# Patient Record
Sex: FEMALE | Race: WHITE | NOT HISPANIC OR LATINO | URBAN - METROPOLITAN AREA
[De-identification: names, ages, dates, MRNs, and addresses within clinical notes are randomized per-mention and may not be internally consistent; named-entity substitution may affect disease eponyms.]

---

## 2021-02-02 ENCOUNTER — OFFICE VISIT - HEALTHEAST (OUTPATIENT)
Dept: FAMILY MEDICINE | Facility: CLINIC | Age: 31
End: 2021-02-02

## 2021-02-02 DIAGNOSIS — Z30.9 ENCOUNTER FOR CONTRACEPTIVE MANAGEMENT, UNSPECIFIED TYPE: ICD-10-CM

## 2021-02-02 RX ORDER — LEVONORGESTREL AND ETHINYL ESTRADIOL 0.1-0.02MG
1 KIT ORAL DAILY
Qty: 90 TABLET | Refills: 3 | Status: SHIPPED | OUTPATIENT
Start: 2021-02-02 | End: 2022-01-05

## 2021-02-02 ASSESSMENT — MIFFLIN-ST. JEOR: SCORE: 1299.01

## 2021-03-08 ENCOUNTER — OFFICE VISIT - HEALTHEAST (OUTPATIENT)
Dept: FAMILY MEDICINE | Facility: CLINIC | Age: 31
End: 2021-03-08

## 2021-03-08 DIAGNOSIS — Z00.00 ANNUAL PHYSICAL EXAM: ICD-10-CM

## 2021-03-08 DIAGNOSIS — F41.9 ANXIETY: ICD-10-CM

## 2021-03-08 RX ORDER — ESCITALOPRAM OXALATE 10 MG/1
10 TABLET ORAL DAILY
Qty: 90 TABLET | Refills: 3 | Status: SHIPPED | OUTPATIENT
Start: 2021-03-08 | End: 2022-03-14

## 2021-03-08 ASSESSMENT — MIFFLIN-ST. JEOR: SCORE: 1289.94

## 2021-04-29 ENCOUNTER — RECORDS - HEALTHEAST (OUTPATIENT)
Dept: FAMILY MEDICINE | Facility: CLINIC | Age: 31
End: 2021-04-29

## 2021-06-05 VITALS
HEIGHT: 65 IN | SYSTOLIC BLOOD PRESSURE: 107 MMHG | BODY MASS INDEX: 20.83 KG/M2 | DIASTOLIC BLOOD PRESSURE: 70 MMHG | RESPIRATION RATE: 20 BRPM | HEART RATE: 66 BPM | WEIGHT: 125 LBS

## 2021-06-05 VITALS
DIASTOLIC BLOOD PRESSURE: 72 MMHG | HEIGHT: 65 IN | SYSTOLIC BLOOD PRESSURE: 123 MMHG | RESPIRATION RATE: 20 BRPM | HEART RATE: 71 BPM | BODY MASS INDEX: 21.16 KG/M2 | WEIGHT: 127 LBS | TEMPERATURE: 98.1 F

## 2021-06-14 NOTE — PROGRESS NOTES
"  Subjective:    Ronaldo Viveros is a 30 y.o. female who presents with chief complaint of medication management.  She needs refill of her birth control pills.  Right now she is on the week of her period.  Has been on birth control for a while, no concerns.  No history of hypertension, non-smoker, no history of blood clots.  Otherwise healthy.  No other regular medications.  No past surgeries.  No past hospitalizations.  She notes her mother had is having some type of kidney problem but is not quite sure what is going on yet.  I had her sign an SAMANTHA to get records from her previous clinic.  When those are available I will review records and update her chart.  It is possible she is due for a Pap smear this year.    Patient Active Problem List   Diagnosis     Encounter for contraceptive management, unspecified type       Current Outpatient Medications:      LESSINA 0.1-20 mg-mcg per tablet, Take 1 tablet by mouth daily., Disp: 90 tablet, Rfl: 3     Objective:   Allergies:  Patient has no known allergies.    Vitals:  Vitals:    02/02/21 0752   BP: 123/72   Patient Site: Right Arm   Patient Position: Sitting   Cuff Size: Adult Regular   Pulse: 71   Resp: 20   Temp: 98.1  F (36.7  C)   TempSrc: Temporal   Weight: 127 lb (57.6 kg)   Height: 5' 4.5\" (1.638 m)     Body mass index is 21.46 kg/m .    Vital signs reviewed.  General: Patient is alert and oriented x 3, in no apparent distress  Cardiac: regular rate and rhythm, no murmurs  Pulmonary: lungs clear to auscultation bilaterally, no crackles, rales, rhonchi, or wheezing noted      Assessment and Plan:   1. Encounter for contraceptive management, unspecified type  Birth control refilled.  It is possible she is due for a Pap smear.  I will review records when available and we will notify her.  No other concerns today.  She declines a flu shot.  - LESSINA 0.1-20 mg-mcg per tablet; Take 1 tablet by mouth daily.  Dispense: 90 tablet; Refill: 3    This dictation uses voice " recognition software, which may contain typographical errors.

## 2021-06-15 NOTE — PROGRESS NOTES
"  Subjective:     Ronaldo Viveros is a 30 y.o. female who presents for an annual exam.     Other concerns today:  1.  Anxiety.  She has had anxiety a long time, but feels like it has been getting worse for the past 1 to 2 years.  During that time she has moved back to [place], switched jobs, and of course pandemic has been taking place.  She has never taken medicine for anxiety before.  She is a therapist, and is also seeing a therapist weekly.  She feels like a lot of her anxiety is due to her family and her fiancé.  She says she feels pressure to be a certain way.  Also its hard to relax after day at work and she is feeling burned out.  She says she feels \"activated all the time.\"  Appetite is pretty good, sleeping pretty good.    We have not yet got her old records.  She got her first Covid vaccine on 2/27/2021 Moderna.    Immunization History   Administered Date(s) Administered     COVID-19,PF,Moderna 02/27/2021     DTP 08/10/1994, 04/29/1996     DTaP, historic 04/09/2009     HPV Quadrivalent 03/31/2015, 06/09/2015, 09/23/2015     Hep A, Adult IM (19yr & older) 04/09/2009     Hep B, Peds or Adolescent 11/14/2003, 12/22/2003, 06/29/2004     HiB, historic,unspecified 08/10/1994, 04/29/1996     IPV 08/10/1994, 04/29/1996     MMR 04/29/1996, 11/14/2003     Td, Adult, Absorbed 11/14/2003     Tdap 02/06/2014       Gynecologic History  Patient's last menstrual period was 03/03/2021.  Contraception: OCP (estrogen/progesterone)  Last Pap: 3 yrs ago?  Last mammogram: n/a    OB History   No obstetric history on file.         Current Outpatient Medications:      LESSINA 0.1-20 mg-mcg per tablet, Take 1 tablet by mouth daily., Disp: 90 tablet, Rfl: 3     escitalopram oxalate (LEXAPRO) 10 MG tablet, Take 1 tablet (10 mg total) by mouth daily., Disp: 90 tablet, Rfl: 3  No past medical history on file.  No past surgical history on file.  Patient has no known allergies.  No family history on file.  Social History "     Socioeconomic History     Marital status: Single     Spouse name: Not on file     Number of children: Not on file     Years of education: Not on file     Highest education level: Not on file   Occupational History     Not on file   Social Needs     Financial resource strain: Not on file     Food insecurity     Worry: Not on file     Inability: Not on file     Transportation needs     Medical: Not on file     Non-medical: Not on file   Tobacco Use     Smoking status: Never Smoker     Smokeless tobacco: Never Used   Substance and Sexual Activity     Alcohol use: Not on file     Drug use: Not on file     Sexual activity: Not on file   Lifestyle     Physical activity     Days per week: Not on file     Minutes per session: Not on file     Stress: Not on file   Relationships     Social connections     Talks on phone: Not on file     Gets together: Not on file     Attends [employer] service: Not on file     Active member of club or organization: Not on file     Attends meetings of clubs or organizations: Not on file     Relationship status: Not on file     Intimate partner violence     Fear of current or ex partner: Not on file     Emotionally abused: Not on file     Physically abused: Not on file     Forced sexual activity: Not on file   Other Topics Concern     Not on file   Social History Narrative     Not on file       Review of Systems  Complete Review of Systems is discussed with patient and is negative except as noted in HPI.    Objective:     Vitals:    03/08/21 0835   BP: 107/70   Pulse: 66   Resp: 20     Body mass index is 21.12 kg/m .    Physical Exam:  General: Patient is alert and Oriented x 3, in no apparent distress.  HEENT, Thyroid, Lymphatic, Cardiac, Pulmonary, GI, Musculoskeletal, and Neuro exams were completed today and grossly normal.  Breast Exam: No lumps, skin changes, lymphadenopathy, or nipple discharge noted bilaterally.  Genitourinary Exam: External genitalia is normal in appearance, vaginal  walls are healthy and without lesions, no significant discharge noted in the vaginal vault, cervix is well visualized and normal in appearance.  Pap was taken without difficulty.    No results found for this or any previous visit.    Assessment and Plan:     1. Annual physical exam  Health Maintenance discussed with patient as appropriate for age and risk factors.  Pap smear completed today.  She is using birth control pills without problem.  She declines STD testing.  We did not offer her flu shot because she is in the process of getting vaccinated for Covid.  Chart updated today.  Second Covid shot due at the end of March.  - Gynecologic Cytology (PAP Smear)    2. Anxiety  We discussed treatment options.  She is already seeing a therapist weekly.  She will continue that.  We discussed as needed meds versus a daily medicine.  She thinks she would like to try daily medicine.  Prescription sent for Lexapro.  Risks, benefits, possible side effects discussed with patient.  If she has any problems she will let us know.  If symptoms worsen while on this medicine she will stop the medicine and let us know.  She will discuss this with her therapist as well.  Discussed other lifestyle modifications including increasing exercise, etc.  Plan follow-up in 1 month.  If things are going well, message sent through [employer] should be fine.  Otherwise follow-up visit, could be virtual.  - escitalopram oxalate (LEXAPRO) 10 MG tablet; Take 1 tablet (10 mg total) by mouth daily.  Dispense: 90 tablet; Refill: 3    This dictation uses voice recognition software, which may contain typographical errors.

## 2021-06-16 PROBLEM — Z30.9 ENCOUNTER FOR CONTRACEPTIVE MANAGEMENT, UNSPECIFIED TYPE: Status: ACTIVE | Noted: 2021-02-02

## 2021-06-16 PROBLEM — F41.9 ANXIETY: Status: ACTIVE | Noted: 2021-03-15

## 2021-06-26 ENCOUNTER — HEALTH MAINTENANCE LETTER (OUTPATIENT)
Age: 31
End: 2021-06-26

## 2021-10-16 ENCOUNTER — HEALTH MAINTENANCE LETTER (OUTPATIENT)
Age: 31
End: 2021-10-16

## 2021-12-11 ENCOUNTER — HEALTH MAINTENANCE LETTER (OUTPATIENT)
Age: 31
End: 2021-12-11

## 2021-12-22 ENCOUNTER — OFFICE VISIT (OUTPATIENT)
Dept: FAMILY MEDICINE | Facility: CLINIC | Age: 31
End: 2021-12-22
Payer: COMMERCIAL

## 2021-12-22 VITALS
SYSTOLIC BLOOD PRESSURE: 117 MMHG | DIASTOLIC BLOOD PRESSURE: 77 MMHG | TEMPERATURE: 97.5 F | HEART RATE: 52 BPM | BODY MASS INDEX: 21.46 KG/M2 | WEIGHT: 127 LBS | OXYGEN SATURATION: 100 %

## 2021-12-22 DIAGNOSIS — R11.10 CHRONIC VOMITING: Primary | ICD-10-CM

## 2021-12-22 PROCEDURE — 0064A COVID-19,PF,MODERNA (18+ YRS BOOSTER .25ML): CPT | Performed by: PHYSICIAN ASSISTANT

## 2021-12-22 PROCEDURE — 91306 COVID-19,PF,MODERNA (18+ YRS BOOSTER .25ML): CPT | Performed by: PHYSICIAN ASSISTANT

## 2021-12-22 PROCEDURE — 99214 OFFICE O/P EST MOD 30 MIN: CPT | Mod: 25 | Performed by: PHYSICIAN ASSISTANT

## 2021-12-22 NOTE — PROGRESS NOTES
"  Subjective:    Ronaldo Viveros is a 31 year old female who presents with chief complaint of chronic vomiting.  This is been a problem since early 2020.  She thinks it is become more frequent in 2021.  It was worst in the spring 2021.  She thought perhaps it was related to her anxiety, because anxiety was pretty bad then.  Anxiety might be a little better now.  However symptoms have persisted.  She has not had a work-up for this before.  She is taking birth control pills.  She does not think she could be pregnant.  She says she often vomits before eating.  Does not seem to be connected with any food.  Appetite is normal.  No triggers before this started, such as a diet change.  She says usually she vomits up some clear or white mucus.  Sometimes she dry heaves.  Sometimes she vomits up foam.  She does not have any times where she vomits up a lot of food or a lot of liquid.  She might feel little nauseous immediately before she vomits, but after she vomits she feels completely normal.  She tells me she is always had a history of a \"sensitive stomach,\" but no other GI diagnoses or issues.  No past surgeries.  Appetite is normal.  No diarrhea or constipation.  She says she got pretty sick in July with respiratory cough.  Covid test negative.  She still feels like she has some more mucus in her throat from that.    Patient Active Problem List   Diagnosis     Abnormal Pap smear of cervix     Contraceptive management - pills     Anxiety     Chronic vomiting       Current Outpatient Medications:      escitalopram oxalate (LEXAPRO) 10 MG tablet, [ESCITALOPRAM OXALATE (LEXAPRO) 10 MG TABLET] Take 1 tablet (10 mg total) by mouth daily., Disp: 90 tablet, Rfl: 3     LESSINA 0.1-20 mg-mcg per tablet, [LESSINA 0.1-20 MG-MCG PER TABLET] Take 1 tablet by mouth daily., Disp: 90 tablet, Rfl: 3      Objective:   Allergies:  Patient has no known allergies.    Vitals:  Vitals:    12/22/21 0932   BP: 117/77   BP Location: Left arm "   Patient Position: Sitting   Cuff Size: Adult Regular   Pulse: 52   Temp: 97.5  F (36.4  C)   SpO2: 100%   Weight: 57.6 kg (127 lb)       Body mass index is 21.46 kg/m .    Vital signs reviewed.  General: Patient is alert and oriented x 3, in no apparent distress    Cardiac: regular rate and rhythm, no murmurs  Pulmonary: lungs clear to auscultation bilaterally, no crackles, rales, rhonchi, or wheezing noted  Abdomen: Non tender to palpation, no hepatosplenomegaly, negative Rodríguez's sign, no pain over McBurney's point, positive bowel sounds, no masses palpable      Assessment and Plan:   1. Chronic vomiting  Differential includes cyclic vomiting syndrome, GERD, IBS-type issue, versus other.  Exam is normal today.  No urgent concerns.  I discussed possibility of a trial of a GERD medicine.  Other option would be to see a GI specialist.  She wants to see GI.  I reviewed we could do some labs today, but it is possible GI may want to do more labs when she sees them.  Since there are no urgent concerns, she is okay with waiting to see GI and then getting any appropriate labs done at that time.  - Adult Gastro Ref - Consult Only; Future       This dictation uses voice recognition software, which may contain typographical errors.

## 2022-01-03 DIAGNOSIS — Z30.9 ENCOUNTER FOR CONTRACEPTIVE MANAGEMENT, UNSPECIFIED TYPE: ICD-10-CM

## 2022-01-05 RX ORDER — LEVONORGESTREL AND ETHINYL ESTRADIOL 0.1-0.02MG
1 KIT ORAL DAILY
Qty: 90 TABLET | Refills: 3 | Status: SHIPPED | OUTPATIENT
Start: 2022-01-05 | End: 2022-12-08

## 2022-01-05 NOTE — TELEPHONE ENCOUNTER
"Routing refill request to provider for review/approval because:  Early refill requested.    Last Written Prescription Date:  2/2/21  Last Fill Quantity: 90,  # refills: 3   Last office visit provider:  12/22/21     Requested Prescriptions   Pending Prescriptions Disp Refills     LESSINA-28 0.1-20 MG-MCG tablet 90 tablet 3     Sig: Take 1 tablet by mouth daily       Contraceptives Protocol Passed - 1/3/2022  9:39 AM        Passed - Patient is not a current smoker if age is 35 or older        Passed - Recent (12 mo) or future (30 days) visit within the authorizing provider's specialty     Patient has had an office visit with the authorizing provider or a provider within the authorizing providers department within the previous 12 mos or has a future within next 30 days. See \"Patient Info\" tab in inbasket, or \"Choose Columns\" in Meds & Orders section of the refill encounter.              Passed - Medication is active on med list        Passed - No active pregnancy on record        Passed - No positive pregnancy test in past 12 months             Robinson Sarabia RN 01/05/22 3:22 PM  "

## 2022-03-14 DIAGNOSIS — F41.9 ANXIETY: ICD-10-CM

## 2022-03-14 RX ORDER — ESCITALOPRAM OXALATE 10 MG/1
10 TABLET ORAL DAILY
Qty: 90 TABLET | Refills: 3 | Status: SHIPPED | OUTPATIENT
Start: 2022-03-14

## 2022-03-15 NOTE — TELEPHONE ENCOUNTER
"Last Written Prescription Date:  03/08/2021  Last Fill Quantity: 90,  # refills: 3   Last office visit provider:   12/22/2021    Requested Prescriptions   Pending Prescriptions Disp Refills     escitalopram (LEXAPRO) 10 MG tablet 90 tablet 3     Sig: Take 1 tablet (10 mg) by mouth daily       SSRIs Protocol Passed - 3/14/2022  1:04 PM        Passed - Recent (12 mo) or future (30 days) visit within the authorizing provider's specialty     Patient has had an office visit with the authorizing provider or a provider within the authorizing providers department within the previous 12 mos or has a future within next 30 days. See \"Patient Info\" tab in inbasket, or \"Choose Columns\" in Meds & Orders section of the refill encounter.              Passed - Medication is active on med list        Passed - Patient is age 18 or older        Passed - No active pregnancy on record        Passed - No positive pregnancy test in last 12 months             Iwona Mercer 03/14/22 8:18 PM  "

## 2022-05-28 ENCOUNTER — HEALTH MAINTENANCE LETTER (OUTPATIENT)
Age: 32
End: 2022-05-28

## 2022-10-01 ENCOUNTER — HEALTH MAINTENANCE LETTER (OUTPATIENT)
Age: 32
End: 2022-10-01

## 2022-12-07 ENCOUNTER — MYC MEDICAL ADVICE (OUTPATIENT)
Dept: FAMILY MEDICINE | Facility: CLINIC | Age: 32
End: 2022-12-07

## 2022-12-07 DIAGNOSIS — Z30.9 ENCOUNTER FOR CONTRACEPTIVE MANAGEMENT, UNSPECIFIED TYPE: ICD-10-CM

## 2022-12-08 RX ORDER — LEVONORGESTREL AND ETHINYL ESTRADIOL 0.1-0.02MG
1 KIT ORAL DAILY
Qty: 90 TABLET | Refills: 3 | Status: SHIPPED | OUTPATIENT
Start: 2022-12-08 | End: 2024-02-03

## 2022-12-08 RX ORDER — LEVONORGESTREL AND ETHINYL ESTRADIOL 0.1-0.02MG
1 KIT ORAL DAILY
Qty: 90 TABLET | Refills: 3 | Status: SHIPPED | OUTPATIENT
Start: 2022-12-08 | End: 2022-12-08

## 2023-06-04 ENCOUNTER — HEALTH MAINTENANCE LETTER (OUTPATIENT)
Age: 33
End: 2023-06-04

## 2024-02-02 DIAGNOSIS — Z30.9 ENCOUNTER FOR CONTRACEPTIVE MANAGEMENT, UNSPECIFIED TYPE: ICD-10-CM

## 2024-02-03 RX ORDER — TIMOLOL MALEATE 5 MG/ML
1 SOLUTION/ DROPS OPHTHALMIC DAILY
Qty: 84 TABLET | Refills: 0 | Status: SHIPPED | OUTPATIENT
Start: 2024-02-03 | End: 2024-04-25

## 2024-04-25 DIAGNOSIS — Z30.9 ENCOUNTER FOR CONTRACEPTIVE MANAGEMENT, UNSPECIFIED TYPE: ICD-10-CM

## 2024-04-25 RX ORDER — TIMOLOL MALEATE 5 MG/ML
1 SOLUTION/ DROPS OPHTHALMIC DAILY
Qty: 84 TABLET | Refills: 0 | Status: SHIPPED | OUTPATIENT
Start: 2024-04-25

## 2024-07-21 ENCOUNTER — HEALTH MAINTENANCE LETTER (OUTPATIENT)
Age: 34
End: 2024-07-21

## 2025-08-10 ENCOUNTER — HEALTH MAINTENANCE LETTER (OUTPATIENT)
Age: 35
End: 2025-08-10